# Patient Record
Sex: MALE | Race: WHITE
[De-identification: names, ages, dates, MRNs, and addresses within clinical notes are randomized per-mention and may not be internally consistent; named-entity substitution may affect disease eponyms.]

---

## 2018-07-28 ENCOUNTER — HOSPITAL ENCOUNTER (EMERGENCY)
Dept: HOSPITAL 25 - ED | Age: 28
Discharge: HOME | End: 2018-07-28
Payer: COMMERCIAL

## 2018-07-28 VITALS — DIASTOLIC BLOOD PRESSURE: 73 MMHG | SYSTOLIC BLOOD PRESSURE: 137 MMHG

## 2018-07-28 DIAGNOSIS — S02.5XXA: Primary | ICD-10-CM
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
## 2018-07-28 PROCEDURE — 99282 EMERGENCY DEPT VISIT SF MDM: CPT

## 2018-07-28 NOTE — ED
Skin Complaint





- HPI Summary


HPI Summary: 


27-year-old male presents with dental fracture today.  He states that he was 

pulling the cord to start the  and came back and hit him in the 

tooth.  He cracked his tooth.  This tooth is still there but there is a 

fracture noted through the pulp.  The area continues to bleed minimally.  he 

does not currently have a dentist.  No allergies antibiotics.  No fevers. he 

may have a dental appointment tomorrow. no other injury. no LOC or head injury. 








- History of Current Complaint


Chief Complaint: EDDentalPain


Time Seen by Provider: 07/28/18 11:25


Stated Complaint: DENTAL PAIN


Pain Intensity: 3





- Allergy/Home Medications


Allergies/Adverse Reactions: 


 Allergies











Allergy/AdvReac Type Severity Reaction Status Date / Time


 


No Known Allergies Allergy   Verified 07/28/18 11:20











Home Medications: 


 Home Medications





Cetirizine HCl [Zyrtec] 10 mg PO DAILY PRN 07/28/18 [History Confirmed 07/28/18]











PMH/Surg Hx/FS Hx/Imm Hx


Endocrine/Hematology History: 


   Denies: Hx Anticoagulant Therapy


Cardiovascular History: 


   Denies: Hx Myocardial Infarction


Infectious Disease History: No


Infectious Disease History: 


   Denies: Traveled Outside the [place] in Last 30 Days





- Family History


Known Family History: 


   Negative: Diabetes





- Social History


Alcohol Use: Occasionally


Substance Use Type: Reports: None


Smoking Status (MU): Never Smoked Tobacco





Review of Systems


Negative: Fever


Positive: Dental Pain


Negative: Chest Pain


Negative: Shortness Of Breath


All Other Systems Reviewed And Are Negative: Yes





Physical Exam


Triage Information Reviewed: Yes


Vital Signs On Initial Exam: 


 Initial Vitals











Temp Pulse Resp BP Pulse Ox


 


 97.8 F   82   16   133/74   96 


 


 07/28/18 11:18  07/28/18 11:18  07/28/18 11:18  07/28/18 11:18  07/28/18 11:18











Vital Signs Reviewed: Yes


Appearance: Positive: Well-Appearing


Skin: Positive: Warm, Dry


Head/Face: Positive: Normal Head/Face Inspection


Eyes: Positive: Normal, Conjunctiva Clear


ENT: Positive: Pharynx normal, TMs normal


Dental: Positive: Dental Fracture @ - 7


Respiratory/Lung Sounds: Positive: Clear to Auscultation, Breath Sounds Present


Cardiovascular: Positive: Normal, RRR


Musculoskeletal: Positive: Normal


Neurological: Positive: Normal


Psychiatric: Positive: Normal





Procedures





- Procedure Summary


Procedure Summary: 


on tooth 7 cleaned area and place casimiro-luz elena








Diagnostics





- Vital Signs


 Vital Signs











  Temp Pulse Resp BP Pulse Ox


 


 07/28/18 11:18  97.8 F  82  16  133/74  96














- Laboratory


Lab Statement: Any lab studies that have been ordered have been reviewed, and 

results considered in the medical decision making process.





Course/Dx





- Course


Course Of Treatment: 27-year-old male presents with dental fracture today.  He 

states that he was pulling the cord to start the  and came back and 

hit him in the tooth.  He cracked his tooth.  This tooth is still there but 

there is a fracture noted through the pulp.  The area continues to bleed 

minimally.  he does not currently have a dentist.  No allergies antibiotics.  

No fevers. he may have a dental appointment tomorrow. no other injury. no LOC 

or head injury.  on exam has fracture tooth at tooth 7. placed casimiro-luz elena on the 

area. will place on amoxicillin. told to follow up with dentist. patient 

understand and agrees with plan.





- Differential Diagnoses - Skin Complaint


Differential Diagnoses: Other - dental fracture, dental abscess





- Diagnoses


Provider Diagnoses: 


 Fractured tooth








Discharge





- Sign-Out/Discharge


Documenting (check all that apply): Patient Departure





- Discharge Plan


Condition: Good


Disposition: HOME


Prescriptions: 


Amoxicillin PO (*) [Amoxicillin 500 MG CAP*] 500 mg PO Q12H #14 cap


Patient Education Materials:  Acute Dental Trauma (ED)


Referrals: 


No Primary Care Phys,NOPCP [Primary Care Provider] - 


Additional Instructions: 


eat soft foods


take antibiotic twice a day for 7 days


Follow up with dentist


Return to ED if develop any new or worsening symptoms





- Billing Disposition and Condition


Condition: GOOD


Disposition: Home





Images





- Images


Dental: 


  __________________________














  __________________________





 1 - fracture